# Patient Record
Sex: MALE | Race: BLACK OR AFRICAN AMERICAN | NOT HISPANIC OR LATINO | Employment: FULL TIME | URBAN - METROPOLITAN AREA
[De-identification: names, ages, dates, MRNs, and addresses within clinical notes are randomized per-mention and may not be internally consistent; named-entity substitution may affect disease eponyms.]

---

## 2022-08-31 ENCOUNTER — OFFICE VISIT (OUTPATIENT)
Dept: PODIATRY | Facility: CLINIC | Age: 37
End: 2022-08-31
Payer: COMMERCIAL

## 2022-08-31 VITALS
WEIGHT: 315 LBS | HEART RATE: 80 BPM | SYSTOLIC BLOOD PRESSURE: 142 MMHG | DIASTOLIC BLOOD PRESSURE: 88 MMHG | HEIGHT: 74 IN | BODY MASS INDEX: 40.43 KG/M2

## 2022-08-31 DIAGNOSIS — L84 CORN OR CALLUS: Primary | ICD-10-CM

## 2022-08-31 DIAGNOSIS — R26.2 DIFFICULTY WALKING: ICD-10-CM

## 2022-08-31 DIAGNOSIS — M79.671 PAIN IN RIGHT FOOT: ICD-10-CM

## 2022-08-31 DIAGNOSIS — B07.0 PLANTAR WART: ICD-10-CM

## 2022-08-31 PROCEDURE — 3008F PR BODY MASS INDEX (BMI) DOCUMENTED: ICD-10-PCS | Mod: S$GLB,,, | Performed by: PODIATRIST

## 2022-08-31 PROCEDURE — 1159F MED LIST DOCD IN RCRD: CPT | Mod: S$GLB,,, | Performed by: PODIATRIST

## 2022-08-31 PROCEDURE — 99203 PR OFFICE/OUTPT VISIT, NEW, LEVL III, 30-44 MIN: ICD-10-PCS | Mod: S$GLB,,, | Performed by: PODIATRIST

## 2022-08-31 PROCEDURE — 4010F ACE/ARB THERAPY RXD/TAKEN: CPT | Mod: S$GLB,,, | Performed by: PODIATRIST

## 2022-08-31 PROCEDURE — 3077F SYST BP >= 140 MM HG: CPT | Mod: S$GLB,,, | Performed by: PODIATRIST

## 2022-08-31 PROCEDURE — 4010F PR ACE/ARB THEARPY RXD/TAKEN: ICD-10-PCS | Mod: S$GLB,,, | Performed by: PODIATRIST

## 2022-08-31 PROCEDURE — 3079F DIAST BP 80-89 MM HG: CPT | Mod: S$GLB,,, | Performed by: PODIATRIST

## 2022-08-31 PROCEDURE — 99203 OFFICE O/P NEW LOW 30 MIN: CPT | Mod: S$GLB,,, | Performed by: PODIATRIST

## 2022-08-31 PROCEDURE — 3077F PR MOST RECENT SYSTOLIC BLOOD PRESSURE >= 140 MM HG: ICD-10-PCS | Mod: S$GLB,,, | Performed by: PODIATRIST

## 2022-08-31 PROCEDURE — 1159F PR MEDICATION LIST DOCUMENTED IN MEDICAL RECORD: ICD-10-PCS | Mod: S$GLB,,, | Performed by: PODIATRIST

## 2022-08-31 PROCEDURE — 3079F PR MOST RECENT DIASTOLIC BLOOD PRESSURE 80-89 MM HG: ICD-10-PCS | Mod: S$GLB,,, | Performed by: PODIATRIST

## 2022-08-31 PROCEDURE — 3008F BODY MASS INDEX DOCD: CPT | Mod: S$GLB,,, | Performed by: PODIATRIST

## 2022-08-31 RX ORDER — OMEPRAZOLE 40 MG/1
40 CAPSULE, DELAYED RELEASE ORAL DAILY
COMMUNITY
Start: 2022-08-02

## 2022-08-31 RX ORDER — CARVEDILOL 25 MG/1
25 TABLET ORAL
COMMUNITY
Start: 2021-10-11 | End: 2023-01-13 | Stop reason: SDUPTHER

## 2022-08-31 RX ORDER — HYDRALAZINE HYDROCHLORIDE 25 MG/1
25 TABLET, FILM COATED ORAL 4 TIMES DAILY
COMMUNITY
Start: 2022-04-16

## 2022-08-31 RX ORDER — NIFEDIPINE 90 MG/1
90 TABLET, FILM COATED, EXTENDED RELEASE ORAL DAILY
COMMUNITY
Start: 2022-08-02

## 2022-08-31 RX ORDER — VALSARTAN 320 MG/1
320 TABLET ORAL DAILY
COMMUNITY
Start: 2022-08-02

## 2022-08-31 RX ORDER — NIFEDIPINE 90 MG/1
90 TABLET, FILM COATED, EXTENDED RELEASE ORAL
COMMUNITY
Start: 2022-08-02 | End: 2023-07-28

## 2022-08-31 RX ORDER — AZITHROMYCIN 250 MG/1
TABLET, FILM COATED ORAL
COMMUNITY
Start: 2022-08-21 | End: 2022-08-31

## 2022-08-31 RX ORDER — HYDROCHLOROTHIAZIDE 25 MG/1
25 TABLET ORAL
COMMUNITY
Start: 2022-04-20

## 2022-08-31 RX ORDER — CARVEDILOL 25 MG/1
25 TABLET ORAL 2 TIMES DAILY
COMMUNITY
Start: 2022-08-02

## 2022-08-31 RX ORDER — CETIRIZINE HYDROCHLORIDE 10 MG/1
10 TABLET ORAL NIGHTLY
COMMUNITY
Start: 2022-04-20 | End: 2022-08-31

## 2022-08-31 RX ORDER — NIFEDIPINE 90 MG/1
90 TABLET, EXTENDED RELEASE ORAL
COMMUNITY
Start: 2022-04-20 | End: 2023-01-13 | Stop reason: SDUPTHER

## 2022-09-08 NOTE — PROGRESS NOTES
Subjective:      Patient ID: Raghav Zheng is a 37 y.o. male.    Chief Complaint: Foot Pain    Raghav is a 37 y.o. male who presents to the podiatry clinic  with complaint of  right foot pain. Onset of the symptoms was about a month ago. Precipitating event:  growth of painful soft tissue lesion . Current symptoms include: ability to bear weight, but with some pain. Aggravating factors: walking. Symptoms have gradually worsened. Patient has had no prior foot problems. Evaluation to date: none. Treatment to date: avoidance of offending activity. Patients rates pain 7/10 on pain scale.    Review of Systems   Constitutional: Negative for chills and fever.   Cardiovascular:  Negative for chest pain.   Respiratory:  Negative for cough and shortness of breath.    Gastrointestinal:  Negative for diarrhea, nausea and vomiting.         Objective:      Physical Exam  Vitals reviewed.   Constitutional:       General: He is not in acute distress.     Appearance: Normal appearance. He is obese. He is not ill-appearing.   HENT:      Head: Normocephalic.      Nose: Nose normal.   Musculoskeletal:         General: Tenderness (plantar right first digit) present.   Skin:     Capillary Refill: Capillary refill takes 2 to 3 seconds.   Neurological:      Mental Status: He is alert and oriented to person, place, and time.   Psychiatric:         Mood and Affect: Mood normal.         Behavior: Behavior normal.         Thought Content: Thought content normal.       Neurologic: Protective light touch sensation intact bilateral lower extremity   Vascular: DP and PT pulses palpable bilateral foot, edema noted to bilateral foot, skin temperature gradient within normal limits bilateral foot, pedal hair growth is present bilateral foot   Musculoskeletal:  5/5 muscle strength noted bilateral foot, mild tenderness with palpation of plantar hyperkeratotic lesion right 1st digit  Dermatologic:  Hyperkeratotic skin lesion noted plantar aspect right  foot no signs of infection present, no open lesions noted bilateral foot, no rashes noted bilateral foot      Assessment:       Encounter Diagnoses   Name Primary?    Corn or callus Yes    Plantar wart          Plan:       Raghav was seen today for foot pain.    Diagnoses and all orders for this visit:    Corn or callus    Plantar wart    I counseled the patient on his conditions, their implications and medical management.        1. Patient was examined and evaluated  2. Patient was made aware of soft tissue lesion plantar aspect right 1st digit.  Patient made aware about possible recurrence over time.  Patient was dispensed offloading pads for prevention of direct pressure to the area.  Patient was advised of apply ointment/moisturizer to the area for softening purposes.  Discussed plantar verruca versus plantar callus.  Mild debridement of plantar callous at this appointment  3. Discussed possible injection therapy of pain complaints worsen over time.  Discussed with patient possible excision of lesion over time if pain is unbearable.  Patient will adjunct with OTC analgesics for pain relief  4. Patient was advised discontinue any barefoot walking.  Patient will wear comfortable shoe gear both inside and outside the home.  Discussed with patient benefits of weight reduction, diet modification, and exercise for improvement of foot complaints and pressure to the right 1st digit  5. Patient will follow-up 2 months p.r.n. for complaints

## 2022-10-07 ENCOUNTER — OFFICE VISIT (OUTPATIENT)
Dept: PODIATRY | Facility: CLINIC | Age: 37
End: 2022-10-07
Payer: COMMERCIAL

## 2022-10-07 VITALS
DIASTOLIC BLOOD PRESSURE: 104 MMHG | HEART RATE: 58 BPM | BODY MASS INDEX: 5.39 KG/M2 | SYSTOLIC BLOOD PRESSURE: 166 MMHG | HEIGHT: 74 IN | WEIGHT: 42 LBS

## 2022-10-07 DIAGNOSIS — M79.671 PAIN IN RIGHT FOOT: ICD-10-CM

## 2022-10-07 DIAGNOSIS — R26.2 DIFFICULTY WALKING: ICD-10-CM

## 2022-10-07 DIAGNOSIS — B07.0 PLANTAR WART: Primary | ICD-10-CM

## 2022-10-07 PROCEDURE — 3008F BODY MASS INDEX DOCD: CPT | Mod: S$GLB,,, | Performed by: PODIATRIST

## 2022-10-07 PROCEDURE — 3080F DIAST BP >= 90 MM HG: CPT | Mod: S$GLB,,, | Performed by: PODIATRIST

## 2022-10-07 PROCEDURE — 4010F PR ACE/ARB THEARPY RXD/TAKEN: ICD-10-PCS | Mod: S$GLB,,, | Performed by: PODIATRIST

## 2022-10-07 PROCEDURE — 3008F PR BODY MASS INDEX (BMI) DOCUMENTED: ICD-10-PCS | Mod: S$GLB,,, | Performed by: PODIATRIST

## 2022-10-07 PROCEDURE — 1159F MED LIST DOCD IN RCRD: CPT | Mod: S$GLB,,, | Performed by: PODIATRIST

## 2022-10-07 PROCEDURE — 17110 DESTRUCTION B9 LES UP TO 14: CPT | Mod: S$GLB,,, | Performed by: PODIATRIST

## 2022-10-07 PROCEDURE — 17110 DESTRUCTION OF BENIGN LESION: ICD-10-PCS | Mod: S$GLB,,, | Performed by: PODIATRIST

## 2022-10-07 PROCEDURE — 3077F SYST BP >= 140 MM HG: CPT | Mod: S$GLB,,, | Performed by: PODIATRIST

## 2022-10-07 PROCEDURE — 3080F PR MOST RECENT DIASTOLIC BLOOD PRESSURE >= 90 MM HG: ICD-10-PCS | Mod: S$GLB,,, | Performed by: PODIATRIST

## 2022-10-07 PROCEDURE — 99213 OFFICE O/P EST LOW 20 MIN: CPT | Mod: 25,S$GLB,, | Performed by: PODIATRIST

## 2022-10-07 PROCEDURE — 99213 PR OFFICE/OUTPT VISIT, EST, LEVL III, 20-29 MIN: ICD-10-PCS | Mod: 25,S$GLB,, | Performed by: PODIATRIST

## 2022-10-07 PROCEDURE — 3077F PR MOST RECENT SYSTOLIC BLOOD PRESSURE >= 140 MM HG: ICD-10-PCS | Mod: S$GLB,,, | Performed by: PODIATRIST

## 2022-10-07 PROCEDURE — 1159F PR MEDICATION LIST DOCUMENTED IN MEDICAL RECORD: ICD-10-PCS | Mod: S$GLB,,, | Performed by: PODIATRIST

## 2022-10-07 PROCEDURE — 4010F ACE/ARB THERAPY RXD/TAKEN: CPT | Mod: S$GLB,,, | Performed by: PODIATRIST

## 2022-10-07 RX ORDER — CYANOCOBALAMIN 1000 UG/ML
INJECTION, SOLUTION INTRAMUSCULAR; SUBCUTANEOUS
COMMUNITY
Start: 2022-09-20

## 2022-10-07 RX ORDER — AMLODIPINE BESYLATE 10 MG/1
10 TABLET ORAL DAILY
COMMUNITY
Start: 2022-09-12 | End: 2023-06-01

## 2022-10-07 NOTE — PROGRESS NOTES
Subjective:      Patient ID: Raghav Zheng is a 37 y.o. male.    Chief Complaint: Foot Pain and Follow-up    Raghav is a 37 y.o. male who presents to the podiatry clinic  with complaint of  right foot pain. Onset of the symptoms was about a month ago. Precipitating event:  growth of soft tissue lesion . Current symptoms include: ability to bear weight, but with some pain and worsening symptoms after a period of activity. Aggravating factors: walking and direct pressure . Symptoms have gradually worsened. Patient has had no prior foot problems. Evaluation to date: none. Treatment to date:  offloading and debridement at previous appointment . Patients rates pain 8/10 on pain scale.    Review of Systems   Constitutional: Negative for chills and fever.   Cardiovascular:  Negative for chest pain and leg swelling.   Respiratory:  Negative for cough and shortness of breath.    Gastrointestinal:  Negative for diarrhea, nausea and vomiting.         Objective:      Physical Exam  Constitutional:       General: He is not in acute distress.     Appearance: Normal appearance. He is not ill-appearing.   HENT:      Head: Normocephalic.   Pulmonary:      Effort: Pulmonary effort is normal. No respiratory distress.   Skin:     Capillary Refill: Capillary refill takes 2 to 3 seconds.   Neurological:      Mental Status: He is alert and oriented to person, place, and time.   Psychiatric:         Mood and Affect: Mood normal.         Behavior: Behavior normal.     Neurologic: Protective light touch sensation intact bilateral lower extremity   Vascular: DP and PT pulses palpable bilateral foot, edema noted to bilateral foot, skin temperature gradient within normal limits bilateral foot, pedal hair growth is present bilateral foot   Musculoskeletal:  5/5 muscle strength noted bilateral foot, mild tenderness with palpation of plantar hyperkeratotic lesion right 1st digit  Dermatologic:  Hyperkeratotic skin lesion noted plantar aspect right  foot no signs of infection present, no open lesions noted bilateral foot, no rashes noted bilateral foot        Assessment:       Encounter Diagnoses   Name Primary?    Plantar wart Yes    Pain in right foot     Difficulty walking          Plan:       Raghav was seen today for foot pain and follow-up.    Diagnoses and all orders for this visit:    Plantar wart  -     Destruction of Benign Lesion    Pain in right foot  -     Destruction of Benign Lesion    Difficulty walking  -     Destruction of Benign Lesion    I counseled the patient on his conditions, their implications and medical management.      1. Patient was examined and evaluated  2. Discussed with patient etiology of plantar verruca versus plantar Tyloma.  Patient was dispensed offloading pads for continued prevention of direct pressure to the lesion.  Patient was advised to apply ointments/moisturizers for softening purposes.  Discussed possible recurrence with patient.  Discussed benefits and risks of cryotherapy with patient   Destruction of Benign Lesion    Date/Time: 10/7/2022 8:00 AM  Performed by: Linh Dubois DPM  Authorized by: Linh Dubois DPM     Consent Done?:  Yes (Verbal)  Indications:     Destruction Indications: plantar verrucae.  Location:     Lower Extremity:  Foot    Detail:  Right foot  Prep:     Preparation: Patient was prepped and draped in usual sterile fashion    Procedure Details:     Cosmetic?: No      Number of lesions:  1    Destruction method:  Cryotherapy    Bleeding:  None     Patient tolerated the procedure well with no immediate complications.     Post-operative instructions were provided for the patient.     Patient was discharged and will follow up for wound check.   3. Patient was advised discontinue use of flip-flops and slides.  Patient will limit barefoot walking  4. Patient will ultimately follow up in 3 weeks or p.r.n. for complaints

## 2022-11-21 ENCOUNTER — OFFICE VISIT (OUTPATIENT)
Dept: PODIATRY | Facility: CLINIC | Age: 37
End: 2022-11-21
Payer: COMMERCIAL

## 2022-11-21 VITALS
HEART RATE: 101 BPM | DIASTOLIC BLOOD PRESSURE: 100 MMHG | SYSTOLIC BLOOD PRESSURE: 157 MMHG | WEIGHT: 315 LBS | HEIGHT: 74 IN | BODY MASS INDEX: 40.43 KG/M2

## 2022-11-21 DIAGNOSIS — M79.671 PAIN IN RIGHT FOOT: ICD-10-CM

## 2022-11-21 DIAGNOSIS — B07.0 PLANTAR WART: Primary | ICD-10-CM

## 2022-11-21 DIAGNOSIS — L84 CORN OR CALLUS: ICD-10-CM

## 2022-11-21 PROCEDURE — 3077F PR MOST RECENT SYSTOLIC BLOOD PRESSURE >= 140 MM HG: ICD-10-PCS | Mod: S$GLB,,, | Performed by: PODIATRIST

## 2022-11-21 PROCEDURE — 3008F PR BODY MASS INDEX (BMI) DOCUMENTED: ICD-10-PCS | Mod: S$GLB,,, | Performed by: PODIATRIST

## 2022-11-21 PROCEDURE — 4010F PR ACE/ARB THEARPY RXD/TAKEN: ICD-10-PCS | Mod: S$GLB,,, | Performed by: PODIATRIST

## 2022-11-21 PROCEDURE — 99213 PR OFFICE/OUTPT VISIT, EST, LEVL III, 20-29 MIN: ICD-10-PCS | Mod: 25,S$GLB,, | Performed by: PODIATRIST

## 2022-11-21 PROCEDURE — 3077F SYST BP >= 140 MM HG: CPT | Mod: S$GLB,,, | Performed by: PODIATRIST

## 2022-11-21 PROCEDURE — 17110 DESTRUCTION B9 LES UP TO 14: CPT | Mod: S$GLB,,, | Performed by: PODIATRIST

## 2022-11-21 PROCEDURE — 17110 DESTRUCTION OF BENIGN LESION: ICD-10-PCS | Mod: S$GLB,,, | Performed by: PODIATRIST

## 2022-11-21 PROCEDURE — 3008F BODY MASS INDEX DOCD: CPT | Mod: S$GLB,,, | Performed by: PODIATRIST

## 2022-11-21 PROCEDURE — 3080F PR MOST RECENT DIASTOLIC BLOOD PRESSURE >= 90 MM HG: ICD-10-PCS | Mod: S$GLB,,, | Performed by: PODIATRIST

## 2022-11-21 PROCEDURE — 3080F DIAST BP >= 90 MM HG: CPT | Mod: S$GLB,,, | Performed by: PODIATRIST

## 2022-11-21 PROCEDURE — 99213 OFFICE O/P EST LOW 20 MIN: CPT | Mod: 25,S$GLB,, | Performed by: PODIATRIST

## 2022-11-21 PROCEDURE — 1159F MED LIST DOCD IN RCRD: CPT | Mod: S$GLB,,, | Performed by: PODIATRIST

## 2022-11-21 PROCEDURE — 4010F ACE/ARB THERAPY RXD/TAKEN: CPT | Mod: S$GLB,,, | Performed by: PODIATRIST

## 2022-11-21 PROCEDURE — 1159F PR MEDICATION LIST DOCUMENTED IN MEDICAL RECORD: ICD-10-PCS | Mod: S$GLB,,, | Performed by: PODIATRIST

## 2022-11-21 NOTE — PROGRESS NOTES
Subjective:      Patient ID: Raghav Zheng is a 37 y.o. male.    Chief Complaint: No chief complaint on file.    Raghav is a 37 y.o. male who presents to the podiatry clinic  with complaint of  right foot pain. Onset of the symptoms was several months ago. Precipitating event:  growth of plantar wart . Current symptoms include: ability to bear weight, but with some pain, redness, and worsening symptoms after a period of activity. Aggravating factors:  direct pressure to distal plantar right first digit . Symptoms have gradually improved. Patient has had prior foot problems. Evaluation to date: none. Treatment to date:  cryotherapy attempted at prior appointment which patients believes was effective . Patients rates pain 3/10 on pain scale.    Review of Systems   Constitutional: Negative for chills and fever.   Cardiovascular:  Negative for chest pain and leg swelling.   Respiratory:  Negative for cough and shortness of breath.    Gastrointestinal:  Negative for diarrhea, nausea and vomiting.         Objective:      Physical Exam  Vitals reviewed.   Constitutional:       General: He is not in acute distress.     Appearance: Normal appearance. He is not ill-appearing.   Pulmonary:      Effort: Pulmonary effort is normal. No respiratory distress.   Skin:     Capillary Refill: Capillary refill takes 2 to 3 seconds.   Neurological:      Mental Status: He is alert and oriented to person, place, and time.   Psychiatric:         Mood and Affect: Mood normal.         Behavior: Behavior normal.     Neurologic: Protective and light touch sensation intact bilateral lower extremity   Vascular: DP and PT pulses palpable bilateral foot, edema noted to bilateral foot, skin temperature gradient within normal limits bilateral foot, pedal hair growth is present bilateral foot   Musculoskeletal:  5/5 muscle strength noted bilateral foot, mild tenderness with palpation of plantar hyperkeratotic lesion right 1st digit  Dermatologic:   Hyperkeratotic skin lesion noted plantar aspect right foot (appears smaller in diameter then prior office visit), no signs of infection present, no open lesions noted bilateral foot, no rashes noted bilateral foot          Assessment:       Encounter Diagnoses   Name Primary?    Plantar wart Yes    Corn or callus     Pain in right foot          Plan:       Diagnoses and all orders for this visit:    Plantar wart  -     Destruction of Benign Lesion    [place] or callus    Pain in right foot    I counseled the patient on his conditions, their implications and medical management.        1. Patient was examined and evaluated.    2. Again discussed with patient etiology of plantar verruca.  Patient made aware about high level of recurrence with these soft tissue lesions.  Area does look to be improved from previous appointment so additional cryotherapy will be attempted today.  Discussed similarities between plantar verruca and IPK versus common callus.    Destruction of Benign Lesion    Date/Time: 11/21/2022 8:30 AM  Performed by: Linh Dubois DPM  Authorized by: Linh Dubois DPM     Consent Done?:  Yes (Verbal)  Indications:     Destruction Indications: right foot plantar verrucae.  Location:     Lower Extremity:  Toe    Detail:  Right big toe  Prep:     Preparation: Patient was prepped and draped in usual sterile fashion    Procedure Details:     Cosmetic?: No      Number of lesions:  1    Destruction method:  Cryotherapy    Dressings: aperture pad and a band aid.    Bleeding:  None     Patient tolerated the procedure well with no immediate complications.     Post-operative instructions were provided for the patient.     Patient was discharged and will follow up for wound check.    3. Patient was dispensed offloading pads for offloading of the area.  Patient will continue with comfortable shoe gear both inside and outside the home.  Patient will adjunct with OTC analgesics for pain relief  4. Patient will  follow up in 1 month or p.r.n. foot complaints

## 2022-12-21 ENCOUNTER — OFFICE VISIT (OUTPATIENT)
Dept: PODIATRY | Facility: CLINIC | Age: 37
End: 2022-12-21
Payer: COMMERCIAL

## 2022-12-21 VITALS
HEIGHT: 74 IN | RESPIRATION RATE: 18 BRPM | OXYGEN SATURATION: 100 % | DIASTOLIC BLOOD PRESSURE: 88 MMHG | WEIGHT: 315 LBS | HEART RATE: 98 BPM | BODY MASS INDEX: 40.43 KG/M2 | SYSTOLIC BLOOD PRESSURE: 139 MMHG

## 2022-12-21 DIAGNOSIS — L84 CORN OR CALLUS: ICD-10-CM

## 2022-12-21 DIAGNOSIS — B07.0 PLANTAR WART: Primary | ICD-10-CM

## 2022-12-21 PROCEDURE — 99212 OFFICE O/P EST SF 10 MIN: CPT | Mod: S$GLB,,, | Performed by: PODIATRIST

## 2022-12-21 PROCEDURE — 3075F PR MOST RECENT SYSTOLIC BLOOD PRESS GE 130-139MM HG: ICD-10-PCS | Mod: S$GLB,,, | Performed by: PODIATRIST

## 2022-12-21 PROCEDURE — 1159F PR MEDICATION LIST DOCUMENTED IN MEDICAL RECORD: ICD-10-PCS | Mod: S$GLB,,, | Performed by: PODIATRIST

## 2022-12-21 PROCEDURE — 3079F PR MOST RECENT DIASTOLIC BLOOD PRESSURE 80-89 MM HG: ICD-10-PCS | Mod: S$GLB,,, | Performed by: PODIATRIST

## 2022-12-21 PROCEDURE — 4010F PR ACE/ARB THEARPY RXD/TAKEN: ICD-10-PCS | Mod: S$GLB,,, | Performed by: PODIATRIST

## 2022-12-21 PROCEDURE — 1159F MED LIST DOCD IN RCRD: CPT | Mod: S$GLB,,, | Performed by: PODIATRIST

## 2022-12-21 PROCEDURE — 4010F ACE/ARB THERAPY RXD/TAKEN: CPT | Mod: S$GLB,,, | Performed by: PODIATRIST

## 2022-12-21 PROCEDURE — 3008F PR BODY MASS INDEX (BMI) DOCUMENTED: ICD-10-PCS | Mod: S$GLB,,, | Performed by: PODIATRIST

## 2022-12-21 PROCEDURE — 3008F BODY MASS INDEX DOCD: CPT | Mod: S$GLB,,, | Performed by: PODIATRIST

## 2022-12-21 PROCEDURE — 99212 PR OFFICE/OUTPT VISIT, EST, LEVL II, 10-19 MIN: ICD-10-PCS | Mod: S$GLB,,, | Performed by: PODIATRIST

## 2022-12-21 PROCEDURE — 3075F SYST BP GE 130 - 139MM HG: CPT | Mod: S$GLB,,, | Performed by: PODIATRIST

## 2022-12-21 PROCEDURE — 3079F DIAST BP 80-89 MM HG: CPT | Mod: S$GLB,,, | Performed by: PODIATRIST

## 2022-12-21 NOTE — PROGRESS NOTES
Subjective:      Patient ID: Raghav Zheng is a 37 y.o. male.    Chief Complaint: Chaparro Avilez is a 37 y.o. male who presents to the podiatry clinic  with complaint of  right foot pain. Onset of the symptoms was several months ago. Precipitating event:  growth of painful soft tissue lesion . Current symptoms include:  none . Aggravating factors:  none currently . Symptoms have essentially resolved. Patient has had prior foot problems. Evaluation to date: none. Treatment to date:  cryotherapy/ freeze of painful soft tissue lesion plantar right 1st toe which was effective . Patients rates pain 0/10 on pain scale.    Review of Systems   Constitutional: Negative for chills and fever.   Cardiovascular:  Negative for chest pain and leg swelling.   Respiratory:  Negative for cough and shortness of breath.    Gastrointestinal:  Negative for diarrhea, nausea and vomiting.         Objective:      Physical Exam  Vitals reviewed.   Constitutional:       General: He is not in acute distress.     Appearance: Normal appearance. He is not ill-appearing.   HENT:      Head: Normocephalic.      Nose: Nose normal.   Pulmonary:      Effort: Pulmonary effort is normal. No respiratory distress.   Skin:     Capillary Refill: Capillary refill takes 2 to 3 seconds.   Neurological:      Mental Status: He is alert and oriented to person, place, and time.   Psychiatric:         Mood and Affect: Mood normal.         Behavior: Behavior normal.     Neurologic: Protective and light touch sensation intact bilateral lower extremity   Vascular: DP and PT pulses palpable bilateral foot, edema noted to bilateral foot, skin temperature gradient within normal limits bilateral foot, pedal hair growth is present bilateral foot   Musculoskeletal:  5/5 muscle strength noted bilateral foot, NO tenderness with palpation of plantar hyperkeratotic lesion right 1st digit  Dermatologic:  Hyperkeratotic skin lesion noted plantar aspect right foot (appears  much smaller in diameter then prior office visit), no signs of infection present, no open lesions noted bilateral foot, no rashes noted bilateral foot        Assessment:       Encounter Diagnoses   Name Primary?    Plantar wart Yes    Corn or callus          Plan:       Raghav was seen today for callouses.    Diagnoses and all orders for this visit:    Plantar wart    Corn or callus      I counseled the patient on his conditions, their implications and medical management.        1. Patient was examined and evaluated  2. Discussed again discussed with patient etiology of plantar verruca.  Patient was made aware about possible recurrence.  Patient was advised to continue use of offloading pads as necessary if pain returns.  Patient was advised to continue with application of ointment/moisturizer for softening purposes to the area.    3. Discussed with patient continued decreased barefoot walking and use of flip-flops.    4. Patient will follow-up in 3 weeks or p.r.n. for complaints

## 2023-02-20 ENCOUNTER — OFFICE VISIT (OUTPATIENT)
Dept: PODIATRY | Facility: CLINIC | Age: 38
End: 2023-02-20
Payer: COMMERCIAL

## 2023-02-20 VITALS
OXYGEN SATURATION: 99 % | WEIGHT: 315 LBS | HEART RATE: 97 BPM | SYSTOLIC BLOOD PRESSURE: 111 MMHG | BODY MASS INDEX: 40.43 KG/M2 | DIASTOLIC BLOOD PRESSURE: 72 MMHG | RESPIRATION RATE: 18 BRPM | HEIGHT: 74 IN

## 2023-02-20 DIAGNOSIS — B07.0 PLANTAR WART: ICD-10-CM

## 2023-02-20 DIAGNOSIS — M76.72 TENDINITIS OF LEFT PERONEUS BREVIS TENDON: Primary | ICD-10-CM

## 2023-02-20 PROCEDURE — 3074F PR MOST RECENT SYSTOLIC BLOOD PRESSURE < 130 MM HG: ICD-10-PCS | Mod: S$GLB,,, | Performed by: PODIATRIST

## 2023-02-20 PROCEDURE — 3008F PR BODY MASS INDEX (BMI) DOCUMENTED: ICD-10-PCS | Mod: S$GLB,,, | Performed by: PODIATRIST

## 2023-02-20 PROCEDURE — 1160F PR REVIEW ALL MEDS BY PRESCRIBER/CLIN PHARMACIST DOCUMENTED: ICD-10-PCS | Mod: S$GLB,,, | Performed by: PODIATRIST

## 2023-02-20 PROCEDURE — 1159F MED LIST DOCD IN RCRD: CPT | Mod: S$GLB,,, | Performed by: PODIATRIST

## 2023-02-20 PROCEDURE — 3078F DIAST BP <80 MM HG: CPT | Mod: S$GLB,,, | Performed by: PODIATRIST

## 2023-02-20 PROCEDURE — 1160F RVW MEDS BY RX/DR IN RCRD: CPT | Mod: S$GLB,,, | Performed by: PODIATRIST

## 2023-02-20 PROCEDURE — 99212 OFFICE O/P EST SF 10 MIN: CPT | Mod: S$GLB,,, | Performed by: PODIATRIST

## 2023-02-20 PROCEDURE — 3074F SYST BP LT 130 MM HG: CPT | Mod: S$GLB,,, | Performed by: PODIATRIST

## 2023-02-20 PROCEDURE — 1159F PR MEDICATION LIST DOCUMENTED IN MEDICAL RECORD: ICD-10-PCS | Mod: S$GLB,,, | Performed by: PODIATRIST

## 2023-02-20 PROCEDURE — 3008F BODY MASS INDEX DOCD: CPT | Mod: S$GLB,,, | Performed by: PODIATRIST

## 2023-02-20 PROCEDURE — 99212 PR OFFICE/OUTPT VISIT, EST, LEVL II, 10-19 MIN: ICD-10-PCS | Mod: S$GLB,,, | Performed by: PODIATRIST

## 2023-02-20 PROCEDURE — 3078F PR MOST RECENT DIASTOLIC BLOOD PRESSURE < 80 MM HG: ICD-10-PCS | Mod: S$GLB,,, | Performed by: PODIATRIST

## 2023-02-22 NOTE — PROGRESS NOTES
Subjective:      Patient ID: Raghav Zheng is a 37 y.o. male.    Chief Complaint: Follow-up    Raghav is a 37 y.o. male who presents to the podiatry clinic  with complaint of  bilateral foot pain. Onset of the symptoms was several weeks ago. Precipitating event:  growth of soft tissue lesion/ possible wart; prolonged ambulation in shoes/ sandals with lateral wear down . Current symptoms include: ability to bear weight, but with some pain and worsening symptoms after a period of activity. Aggravating factors:  prolonged standing/ ambulation . Symptoms have essentially resolved. Patient has had prior foot problems. Evaluation to date: none. Treatment to date:  cryotherapy of plantar wart which was very effective . Patients rates pain 0/10 on pain scale.    Review of Systems   Constitutional: Negative for chills and fever.   Cardiovascular:  Negative for chest pain and leg swelling.   Respiratory:  Negative for cough and shortness of breath.    Gastrointestinal:  Negative for diarrhea, nausea and vomiting.         Objective:      Physical Exam  Vitals reviewed.   Constitutional:       General: He is not in acute distress.     Appearance: Normal appearance. He is not ill-appearing.   HENT:      Head: Normocephalic.      Nose: Nose normal.   Pulmonary:      Effort: Pulmonary effort is normal. No respiratory distress.   Skin:     Capillary Refill: Capillary refill takes 2 to 3 seconds.   Neurological:      Mental Status: He is alert and oriented to person, place, and time.   Psychiatric:         Mood and Affect: Mood normal.         Behavior: Behavior normal.     Neurologic: Protective and light touch sensation intact bilateral lower extremity   Vascular: DP and PT pulses palpable bilateral foot, edema noted to bilateral foot, skin temperature gradient within normal limits bilateral foot, pedal hair growth is present bilateral foot   Musculoskeletal:  5/5 muscle strength noted bilateral foot, mild tenderness with  inversion/ eversion of the bilateral foot and pain with palpation of the 5th metatarsal base, mild tenderness with range of motion of bilateral peroneal brevis tendon and palpation of its insertion site   Dermatologic:  No evidence of previous hyperkeratotic skin lesion with loss of skin lines (probable verrucae) which was previously noted at the distal tip of the right 1st digit, no signs of infection present, no open lesions noted bilateral foot, no rashes noted bilateral foot, mild hyperkeratotic skin lesion noted to the bilateral plantar 5th MPJ        Assessment:       Encounter Diagnoses   Name Primary?    Tendinitis of left peroneus brevis tendon Yes    Plantar wart          Plan:       Raghav was seen today for follow-up.    Diagnoses and all orders for this visit:    Tendinitis of left peroneus brevis tendon    Plantar wart      I counseled the patient on his conditions, their implications and medical management.        1. Patient was examined and evaluated  2. Discussed potential etiology of peroneal brevis tendinitis.  Patient was advised to discontinue any shoes that show excessive lateral wear and tear.  Patient was advised to ice and elevate lower extremity when at rest and to adjunct with OTC NSAIDs for pain relief  3. Discussed with patient previous wart distal tip of the right 1st digit.  Patient made aware that the lesion seems to have resolved with previous cryotherapy.  Patient was warned of possible recurrence over time.    4. Patient was advised to continue with comfortable shoe gear both inside and outside the home.    5. Patient will follow up p.r.n. for complaints

## 2023-06-01 ENCOUNTER — OFFICE VISIT (OUTPATIENT)
Dept: PODIATRY | Facility: CLINIC | Age: 38
End: 2023-06-01
Payer: COMMERCIAL

## 2023-06-01 VITALS
SYSTOLIC BLOOD PRESSURE: 131 MMHG | WEIGHT: 315 LBS | DIASTOLIC BLOOD PRESSURE: 90 MMHG | BODY MASS INDEX: 40.43 KG/M2 | HEART RATE: 74 BPM | HEIGHT: 74 IN

## 2023-06-01 DIAGNOSIS — M76.71 TENDINITIS OF RIGHT PERONEUS BREVIS TENDON: ICD-10-CM

## 2023-06-01 DIAGNOSIS — S96.911A RIGHT FOOT STRAIN, INITIAL ENCOUNTER: Primary | ICD-10-CM

## 2023-06-01 DIAGNOSIS — M79.671 RIGHT FOOT PAIN: ICD-10-CM

## 2023-06-01 DIAGNOSIS — M77.51 BURSITIS OF RIGHT FOOT: ICD-10-CM

## 2023-06-01 DIAGNOSIS — R26.2 DIFFICULTY WALKING: ICD-10-CM

## 2023-06-01 PROCEDURE — 3075F PR MOST RECENT SYSTOLIC BLOOD PRESS GE 130-139MM HG: ICD-10-PCS | Mod: S$GLB,,, | Performed by: PODIATRIST

## 2023-06-01 PROCEDURE — 20600 DRAIN/INJ JOINT/BURSA W/O US: CPT | Mod: RT,S$GLB,, | Performed by: PODIATRIST

## 2023-06-01 PROCEDURE — 1160F RVW MEDS BY RX/DR IN RCRD: CPT | Mod: S$GLB,,, | Performed by: PODIATRIST

## 2023-06-01 PROCEDURE — 3075F SYST BP GE 130 - 139MM HG: CPT | Mod: S$GLB,,, | Performed by: PODIATRIST

## 2023-06-01 PROCEDURE — 3080F DIAST BP >= 90 MM HG: CPT | Mod: S$GLB,,, | Performed by: PODIATRIST

## 2023-06-01 PROCEDURE — 3080F PR MOST RECENT DIASTOLIC BLOOD PRESSURE >= 90 MM HG: ICD-10-PCS | Mod: S$GLB,,, | Performed by: PODIATRIST

## 2023-06-01 PROCEDURE — 3008F BODY MASS INDEX DOCD: CPT | Mod: S$GLB,,, | Performed by: PODIATRIST

## 2023-06-01 PROCEDURE — 3008F PR BODY MASS INDEX (BMI) DOCUMENTED: ICD-10-PCS | Mod: S$GLB,,, | Performed by: PODIATRIST

## 2023-06-01 PROCEDURE — 1159F PR MEDICATION LIST DOCUMENTED IN MEDICAL RECORD: ICD-10-PCS | Mod: S$GLB,,, | Performed by: PODIATRIST

## 2023-06-01 PROCEDURE — 20600 PR DRAIN/INJECT SMALL JOINT/BURSA: ICD-10-PCS | Mod: RT,S$GLB,, | Performed by: PODIATRIST

## 2023-06-01 PROCEDURE — 4010F PR ACE/ARB THEARPY RXD/TAKEN: ICD-10-PCS | Mod: S$GLB,,, | Performed by: PODIATRIST

## 2023-06-01 PROCEDURE — 99213 PR OFFICE/OUTPT VISIT, EST, LEVL III, 20-29 MIN: ICD-10-PCS | Mod: 25,S$GLB,, | Performed by: PODIATRIST

## 2023-06-01 PROCEDURE — 99213 OFFICE O/P EST LOW 20 MIN: CPT | Mod: 25,S$GLB,, | Performed by: PODIATRIST

## 2023-06-01 PROCEDURE — 4010F ACE/ARB THERAPY RXD/TAKEN: CPT | Mod: S$GLB,,, | Performed by: PODIATRIST

## 2023-06-01 PROCEDURE — 1159F MED LIST DOCD IN RCRD: CPT | Mod: S$GLB,,, | Performed by: PODIATRIST

## 2023-06-01 PROCEDURE — 1160F PR REVIEW ALL MEDS BY PRESCRIBER/CLIN PHARMACIST DOCUMENTED: ICD-10-PCS | Mod: S$GLB,,, | Performed by: PODIATRIST

## 2023-06-01 RX ORDER — SEMAGLUTIDE 2.4 MG/.75ML
2.4 INJECTION, SOLUTION SUBCUTANEOUS
COMMUNITY
Start: 2023-05-08

## 2023-06-01 RX ORDER — NIFEDIPINE 60 MG/1
60 TABLET, EXTENDED RELEASE ORAL
COMMUNITY
Start: 2023-05-01

## 2023-06-01 RX ORDER — LIDOCAINE HYDROCHLORIDE 10 MG/ML
1 INJECTION INFILTRATION; PERINEURAL
Status: DISCONTINUED | OUTPATIENT
Start: 2023-06-01 | End: 2023-06-01 | Stop reason: HOSPADM

## 2023-06-01 RX ORDER — AMOXICILLIN AND CLAVULANATE POTASSIUM 875; 125 MG/1; MG/1
1 TABLET, FILM COATED ORAL EVERY 12 HOURS
COMMUNITY
Start: 2023-05-26

## 2023-06-01 RX ORDER — DEXAMETHASONE SODIUM PHOSPHATE 4 MG/ML
4 INJECTION, SOLUTION INTRA-ARTICULAR; INTRALESIONAL; INTRAMUSCULAR; INTRAVENOUS; SOFT TISSUE
Status: DISCONTINUED | OUTPATIENT
Start: 2023-06-01 | End: 2023-06-01 | Stop reason: HOSPADM

## 2023-06-01 RX ORDER — PROMETHAZINE HYDROCHLORIDE AND DEXTROMETHORPHAN HYDROBROMIDE 6.25; 15 MG/5ML; MG/5ML
5 SYRUP ORAL EVERY 6 HOURS PRN
COMMUNITY
Start: 2023-05-26

## 2023-06-01 RX ADMIN — LIDOCAINE HYDROCHLORIDE 1 ML: 10 INJECTION INFILTRATION; PERINEURAL at 10:06

## 2023-06-01 RX ADMIN — DEXAMETHASONE SODIUM PHOSPHATE 4 MG: 4 INJECTION, SOLUTION INTRA-ARTICULAR; INTRALESIONAL; INTRAMUSCULAR; INTRAVENOUS; SOFT TISSUE at 10:06

## 2023-06-01 NOTE — PROGRESS NOTES
Subjective:     Patient ID: aRghav Zheng is a 37 y.o. male.    Chief Complaint: Follow-up and Foot Pain    Raghav is a 37 y.o. male who presents to the podiatry clinic  with complaint of  right foot pain. Onset of the symptoms was a week ago. Precipitating event: injured while playing basketball and increased activity. Current symptoms include: ability to bear weight, but with some pain, swelling, and worsening symptoms after a period of activity. Aggravating factors:  prolonged use . Symptoms have gradually improved. Patient has had prior foot problems. Evaluation to date: none. Treatment to date: OTC analgesics which are somewhat effective and rest. Patients rates pain 5/10 on pain scale.    Review of Systems   Constitutional: Negative for chills and fever.   Cardiovascular:  Negative for chest pain and leg swelling.   Respiratory:  Negative for cough and shortness of breath.    Gastrointestinal:  Negative for diarrhea, nausea and vomiting.      Objective:     Physical Exam  Vitals reviewed.   Constitutional:       General: He is not in acute distress.     Appearance: Normal appearance. He is not ill-appearing.   HENT:      Head: Normocephalic.      Nose: Nose normal.   Cardiovascular:      Rate and Rhythm: Normal rate.   Pulmonary:      Effort: Pulmonary effort is normal. No respiratory distress.   Skin:     Capillary Refill: Capillary refill takes 2 to 3 seconds.   Neurological:      Mental Status: He is alert and oriented to person, place, and time.   Psychiatric:         Mood and Affect: Mood normal.         Behavior: Behavior normal.         Thought Content: Thought content normal.     Neurologic:  Protective and light touch sensation intact bilateral lower extremity  Vascular:  DP and PT pulses palpable 2/4 bilateral foot, capillary fill time less than 3 seconds to distal digits bilateral foot   Musculoskeletal:  5/5 muscle strength noted bilateral foot, right foot has pain and tenderness at the peroneal  brevis tendon insertion and along the lateral margin of the right foot.  Patient has pain and tenderness width inversion of the right foot but at the distal portion of the foot near the 5th metatarsal  Dermatologic:  No open lesions noted bilateral foot, no rashes noted bilateral foot, no bruising noted to the lateral right foot, mild edema noted lateral right foot      Assessment:      Encounter Diagnoses   Name Primary?    Right foot strain, initial encounter Yes    Right foot pain     Difficulty walking     Tendinitis of right peroneus brevis tendon     Bursitis of right foot      Plan:     Raghav was seen today for follow-up and foot pain.    Diagnoses and all orders for this visit:    Right foot strain, initial encounter    Right foot pain  -     Small Joint Aspiration/Injection    Difficulty walking  -     Small Joint Aspiration/Injection    Tendinitis of right peroneus brevis tendon    Bursitis of right foot  -     Small Joint Aspiration/Injection      I counseled the patient on his conditions, their implications and medical management.        1. Patient was examined and evaluated.    2. Discussed patient etiology of right foot strain.  Patient was advised to perform PRICE therapy at home.  Patient had a low dye ankle/foot strapping applied to the right foot.  Patient was advised to discontinue basketball until symptoms improve.    Small Joint Aspiration/Injection    Date/Time: 6/1/2023 10:45 AM  Performed by: Linh Dubois DPM  Authorized by: Linh Dubois DPM     Consent Done?:  Yes (Verbal)  Indications:  Pain  Location:  Foot (right)  Foot joint: right 5th metatarsal bursa.  Ultrasonic guidance for needle placement?: No    Needle size:  25 G  Approach:  Lateral  Medications:  1 mL LIDOcaine HCL 10 mg/ml (1%) 10 mg/mL (1 %); 4 mg dexAMETHasone 4 mg/mL  Patient tolerance:  Patient tolerated the procedure well with no immediate complications    3. Discussed with patient etiology of peroneal brevis  tendinitis.  Patient was shown active and passive range of motion techniques.  Patient was advised to alter shoe gear for better comfort and fit   4. Patient will follow-up in 1 week or p.r.n. for complaints

## 2023-06-01 NOTE — PROCEDURES
"Procedures  STRAPPING/TAPING OF ANKLE AND FOOT:  LOW DYE RIGHT FOOT    Patient had a low dye taping applied to the right foot-1 in athletic tape applied from lateral 5th MPJ around the ankle to medial 1st MPJ x 2 with two plantar straps of 2 inch athletic tape applied from medial to lateral from just distal to the heel to the level of the midfoot. Then,  1 in athletic tape applied from lateral 5th MPJ around the ankle to medial 1st MPJ x 2 along with a Chavez's rest strap composed of 3 " tensoplast. Keep taping clean, dry, and intact for 1 - 3 days.    "

## 2023-06-08 ENCOUNTER — OFFICE VISIT (OUTPATIENT)
Dept: PODIATRY | Facility: CLINIC | Age: 38
End: 2023-06-08
Payer: COMMERCIAL

## 2023-06-08 VITALS
DIASTOLIC BLOOD PRESSURE: 93 MMHG | HEIGHT: 74 IN | BODY MASS INDEX: 40.43 KG/M2 | SYSTOLIC BLOOD PRESSURE: 135 MMHG | WEIGHT: 315 LBS | HEART RATE: 77 BPM

## 2023-06-08 DIAGNOSIS — M79.671 RIGHT FOOT PAIN: ICD-10-CM

## 2023-06-08 DIAGNOSIS — S96.911D RIGHT FOOT STRAIN, SUBSEQUENT ENCOUNTER: Primary | ICD-10-CM

## 2023-06-08 DIAGNOSIS — M77.51 BURSITIS OF RIGHT FOOT: ICD-10-CM

## 2023-06-08 DIAGNOSIS — R26.2 DIFFICULTY WALKING: ICD-10-CM

## 2023-06-08 PROCEDURE — 3008F BODY MASS INDEX DOCD: CPT | Mod: S$GLB,,, | Performed by: PODIATRIST

## 2023-06-08 PROCEDURE — 4010F PR ACE/ARB THEARPY RXD/TAKEN: ICD-10-PCS | Mod: S$GLB,,, | Performed by: PODIATRIST

## 2023-06-08 PROCEDURE — 1159F MED LIST DOCD IN RCRD: CPT | Mod: S$GLB,,, | Performed by: PODIATRIST

## 2023-06-08 PROCEDURE — 3075F PR MOST RECENT SYSTOLIC BLOOD PRESS GE 130-139MM HG: ICD-10-PCS | Mod: S$GLB,,, | Performed by: PODIATRIST

## 2023-06-08 PROCEDURE — 3080F PR MOST RECENT DIASTOLIC BLOOD PRESSURE >= 90 MM HG: ICD-10-PCS | Mod: S$GLB,,, | Performed by: PODIATRIST

## 2023-06-08 PROCEDURE — 3008F PR BODY MASS INDEX (BMI) DOCUMENTED: ICD-10-PCS | Mod: S$GLB,,, | Performed by: PODIATRIST

## 2023-06-08 PROCEDURE — 1159F PR MEDICATION LIST DOCUMENTED IN MEDICAL RECORD: ICD-10-PCS | Mod: S$GLB,,, | Performed by: PODIATRIST

## 2023-06-08 PROCEDURE — 99213 OFFICE O/P EST LOW 20 MIN: CPT | Mod: 25,S$GLB,, | Performed by: PODIATRIST

## 2023-06-08 PROCEDURE — 20600 DRAIN/INJ JOINT/BURSA W/O US: CPT | Mod: RT,S$GLB,, | Performed by: PODIATRIST

## 2023-06-08 PROCEDURE — 3080F DIAST BP >= 90 MM HG: CPT | Mod: S$GLB,,, | Performed by: PODIATRIST

## 2023-06-08 PROCEDURE — 4010F ACE/ARB THERAPY RXD/TAKEN: CPT | Mod: S$GLB,,, | Performed by: PODIATRIST

## 2023-06-08 PROCEDURE — 1160F PR REVIEW ALL MEDS BY PRESCRIBER/CLIN PHARMACIST DOCUMENTED: ICD-10-PCS | Mod: S$GLB,,, | Performed by: PODIATRIST

## 2023-06-08 PROCEDURE — 20600 SMALL JOINT ASPIRATION/INJECTION: R SMALL MTP: ICD-10-PCS | Mod: RT,S$GLB,, | Performed by: PODIATRIST

## 2023-06-08 PROCEDURE — 3075F SYST BP GE 130 - 139MM HG: CPT | Mod: S$GLB,,, | Performed by: PODIATRIST

## 2023-06-08 PROCEDURE — 1160F RVW MEDS BY RX/DR IN RCRD: CPT | Mod: S$GLB,,, | Performed by: PODIATRIST

## 2023-06-08 PROCEDURE — 99213 PR OFFICE/OUTPT VISIT, EST, LEVL III, 20-29 MIN: ICD-10-PCS | Mod: 25,S$GLB,, | Performed by: PODIATRIST

## 2023-06-08 RX ORDER — FLUCONAZOLE 150 MG/1
150 TABLET ORAL
COMMUNITY
Start: 2023-06-06

## 2023-06-08 RX ADMIN — DEXAMETHASONE SODIUM PHOSPHATE 4 MG: 4 INJECTION, SOLUTION INTRA-ARTICULAR; INTRALESIONAL; INTRAMUSCULAR; INTRAVENOUS; SOFT TISSUE at 09:06

## 2023-06-08 RX ADMIN — LIDOCAINE HYDROCHLORIDE 1 ML: 10 INJECTION INFILTRATION; PERINEURAL at 09:06

## 2023-06-20 RX ORDER — LIDOCAINE HYDROCHLORIDE 10 MG/ML
1 INJECTION INFILTRATION; PERINEURAL
Status: DISCONTINUED | OUTPATIENT
Start: 2023-06-08 | End: 2023-06-20 | Stop reason: HOSPADM

## 2023-06-20 RX ORDER — DEXAMETHASONE SODIUM PHOSPHATE 4 MG/ML
4 INJECTION, SOLUTION INTRA-ARTICULAR; INTRALESIONAL; INTRAMUSCULAR; INTRAVENOUS; SOFT TISSUE
Status: DISCONTINUED | OUTPATIENT
Start: 2023-06-08 | End: 2023-06-20 | Stop reason: HOSPADM

## 2023-06-20 NOTE — PROGRESS NOTES
Subjective:     Patient ID: Raghav Zheng is a 37 y.o. male.    Chief Complaint: Foot Pain    Raghav is a 37 y.o. male who presents to the podiatry clinic  with complaint of  right foot pain. Onset of the symptoms was several weeks ago. Precipitating event: injured while playing basketball and increased activity. Current symptoms include: ability to bear weight, but with some pain, swelling, and worsening symptoms after a period of activity. Aggravating factors:  prolonged use . Symptoms have gradually improved. Patient has had prior foot problems. Evaluation to date: none. Treatment to date: OTC analgesics which are somewhat effective and rest and previous foot taping and strapping and injection therapy which helped temporarily. Patients rates pain 5/10 on pain scale.    Review of Systems   Constitutional: Negative for chills and fever.   Cardiovascular:  Negative for chest pain and leg swelling.   Respiratory:  Negative for cough and shortness of breath.    Gastrointestinal:  Negative for diarrhea, nausea and vomiting.      Objective:     Physical Exam  Vitals reviewed.   Constitutional:       General: He is not in acute distress.     Appearance: Normal appearance. He is not ill-appearing.   HENT:      Head: Normocephalic.      Nose: Nose normal.   Cardiovascular:      Rate and Rhythm: Normal rate.   Pulmonary:      Effort: Pulmonary effort is normal. No respiratory distress.   Skin:     Capillary Refill: Capillary refill takes 2 to 3 seconds.   Neurological:      Mental Status: He is alert and oriented to person, place, and time.   Psychiatric:         Mood and Affect: Mood normal.         Behavior: Behavior normal.         Thought Content: Thought content normal.     Neurologic:  Protective and light touch sensation intact bilateral lower extremity  Vascular:  DP and PT pulses palpable 2/4 bilateral foot, capillary fill time less than 3 seconds to distal digits bilateral foot   Musculoskeletal:  5/5 muscle  strength noted bilateral foot, right foot has pain and tenderness at the peroneal brevis tendon insertion and along the lateral margin of the right foot.  Patient has pain and tenderness width inversion of the right foot but at the distal portion of the foot near the 5th metatarsal  Dermatologic:  No open lesions noted bilateral foot, no rashes noted bilateral foot, no bruising noted to the lateral right foot, mild edema noted lateral right foot    Assessment:      Encounter Diagnoses   Name Primary?    Right foot strain, subsequent encounter Yes    Right foot pain     Difficulty walking     Bursitis of right foot      Plan:     Raghav was seen today for foot pain.    Diagnoses and all orders for this visit:    Right foot strain, subsequent encounter  -     Small Joint Aspiration/Injection: R small MTP    Right foot pain  -     Small Joint Aspiration/Injection: R small MTP    Difficulty walking  -     Small Joint Aspiration/Injection: R small MTP    Bursitis of right foot  -     Small Joint Aspiration/Injection: R small MTP      I counseled the patient on his conditions, their implications and medical management.           1. Patient was examined and evaluated.    2. Again Discussed with patient etiology of right foot strain.  Patient was advised to continue to perform PRICE therapy at home.  Patient was advised to discontinue basketball until symptoms improve.    Small Joint Aspiration/Injection: R small MTP    Date/Time: 6/8/2023 9:15 AM  Performed by: Linh Dubois DPM  Authorized by: Linh Dubois DPM     Consent Done?:  Yes (Verbal)  Indications:  Pain and joint swelling  Location:  Small toe  Site:  R small MTP  Ultrasonic guidance for needle placement?: No    Needle size:  25 G  Approach:  Lateral  Medications:  1 mL LIDOcaine HCL 10 mg/ml (1%) 10 mg/mL (1 %); 4 mg dexAMETHasone 4 mg/mL  Patient tolerance:  Patient tolerated the procedure well with no immediate complications    3. Discussed with  patient etiology of peroneal brevis tendinitis.  Patient was shown active and passive range of motion techniques.  Patient was advised to alter shoe gear for better comfort and fit   4. Patient will follow-up in 1 week or p.r.n. for complaints

## 2023-06-22 ENCOUNTER — OFFICE VISIT (OUTPATIENT)
Dept: PODIATRY | Facility: CLINIC | Age: 38
End: 2023-06-22
Payer: COMMERCIAL

## 2023-06-22 VITALS
HEIGHT: 74 IN | HEART RATE: 73 BPM | BODY MASS INDEX: 40.43 KG/M2 | DIASTOLIC BLOOD PRESSURE: 81 MMHG | WEIGHT: 315 LBS | SYSTOLIC BLOOD PRESSURE: 130 MMHG

## 2023-06-22 DIAGNOSIS — M79.671 RIGHT FOOT PAIN: ICD-10-CM

## 2023-06-22 DIAGNOSIS — S96.911D RIGHT FOOT STRAIN, SUBSEQUENT ENCOUNTER: Primary | ICD-10-CM

## 2023-06-22 PROCEDURE — 3075F PR MOST RECENT SYSTOLIC BLOOD PRESS GE 130-139MM HG: ICD-10-PCS | Mod: S$GLB,,, | Performed by: PODIATRIST

## 2023-06-22 PROCEDURE — 3079F PR MOST RECENT DIASTOLIC BLOOD PRESSURE 80-89 MM HG: ICD-10-PCS | Mod: S$GLB,,, | Performed by: PODIATRIST

## 2023-06-22 PROCEDURE — 1159F PR MEDICATION LIST DOCUMENTED IN MEDICAL RECORD: ICD-10-PCS | Mod: S$GLB,,, | Performed by: PODIATRIST

## 2023-06-22 PROCEDURE — 1159F MED LIST DOCD IN RCRD: CPT | Mod: S$GLB,,, | Performed by: PODIATRIST

## 2023-06-22 PROCEDURE — 1160F RVW MEDS BY RX/DR IN RCRD: CPT | Mod: S$GLB,,, | Performed by: PODIATRIST

## 2023-06-22 PROCEDURE — 3079F DIAST BP 80-89 MM HG: CPT | Mod: S$GLB,,, | Performed by: PODIATRIST

## 2023-06-22 PROCEDURE — 4010F ACE/ARB THERAPY RXD/TAKEN: CPT | Mod: S$GLB,,, | Performed by: PODIATRIST

## 2023-06-22 PROCEDURE — 3008F BODY MASS INDEX DOCD: CPT | Mod: S$GLB,,, | Performed by: PODIATRIST

## 2023-06-22 PROCEDURE — 3008F PR BODY MASS INDEX (BMI) DOCUMENTED: ICD-10-PCS | Mod: S$GLB,,, | Performed by: PODIATRIST

## 2023-06-22 PROCEDURE — 99212 PR OFFICE/OUTPT VISIT, EST, LEVL II, 10-19 MIN: ICD-10-PCS | Mod: S$GLB,,, | Performed by: PODIATRIST

## 2023-06-22 PROCEDURE — 4010F PR ACE/ARB THEARPY RXD/TAKEN: ICD-10-PCS | Mod: S$GLB,,, | Performed by: PODIATRIST

## 2023-06-22 PROCEDURE — 3075F SYST BP GE 130 - 139MM HG: CPT | Mod: S$GLB,,, | Performed by: PODIATRIST

## 2023-06-22 PROCEDURE — 1160F PR REVIEW ALL MEDS BY PRESCRIBER/CLIN PHARMACIST DOCUMENTED: ICD-10-PCS | Mod: S$GLB,,, | Performed by: PODIATRIST

## 2023-06-22 PROCEDURE — 99212 OFFICE O/P EST SF 10 MIN: CPT | Mod: S$GLB,,, | Performed by: PODIATRIST

## 2023-06-22 RX ORDER — CLOTRIMAZOLE AND BETAMETHASONE DIPROPIONATE 10; .64 MG/G; MG/G
CREAM TOPICAL 2 TIMES DAILY
COMMUNITY
Start: 2023-06-09

## 2023-06-22 RX ORDER — EMTRICITABINE AND TENOFOVIR ALAFENAMIDE 200; 25 MG/1; MG/1
1 TABLET ORAL
COMMUNITY
Start: 2023-06-20

## 2023-06-22 RX ORDER — MONTELUKAST SODIUM 10 MG/1
10 TABLET ORAL
COMMUNITY
Start: 2023-06-21

## 2023-06-22 NOTE — PROGRESS NOTES
Subjective:     Patient ID: Raghav Zheng is a 37 y.o. male.    Chief Complaint: Follow-up and Foot Pain    Raghav is a 37 y.o. male who presents to the podiatry clinic  with complaint of  right foot pain.  Precipitating event: injured while playing basketball and increased activity. Current symptoms include: ability to bear weight, but with some pain, swelling, and worsening symptoms after a period of activity. Aggravating factors:  prolonged use . Symptoms have gradually improved. Patient has had prior foot problems. Evaluation to date: none. Treatment to date: OTC analgesics which are somewhat effective and rest and previous foot taping and strapping and injection therapy which very effective. Patients rates pain 0/10 on pain scale.    Review of Systems   Constitutional: Negative for chills and fever.   Cardiovascular:  Negative for chest pain and leg swelling.   Respiratory:  Negative for cough and shortness of breath.    Gastrointestinal:  Negative for diarrhea, nausea and vomiting.      Objective:     Physical Exam  Vitals reviewed.   Constitutional:       General: He is not in acute distress.     Appearance: Normal appearance. He is not ill-appearing.   HENT:      Head: Normocephalic.      Nose: Nose normal.   Cardiovascular:      Rate and Rhythm: Normal rate.   Pulmonary:      Effort: Pulmonary effort is normal. No respiratory distress.   Skin:     Capillary Refill: Capillary refill takes 2 to 3 seconds.   Neurological:      Mental Status: He is alert and oriented to person, place, and time.   Psychiatric:         Mood and Affect: Mood normal.         Behavior: Behavior normal.         Thought Content: Thought content normal.     Neurologic:  Protective and light touch sensation intact bilateral lower extremity  Vascular:  DP and PT pulses palpable 2/4 bilateral foot, capillary fill time less than 3 seconds to distal digits bilateral foot   Musculoskeletal:  5/5 muscle strength noted bilateral foot,  no pain  or tenderness with palpation right peroneal brevis tendon insertion right foot, no irritation or pain with inversion or eversion of the right foot, no masses present bilateral foot  Dermatologic:  No open lesions noted bilateral foot, no rashes noted bilateral foot, no bruising noted to the lateral right foot, NO edema noted lateral right foot    Assessment:      Encounter Diagnoses   Name Primary?    Right foot strain, subsequent encounter Yes    Right foot pain      Plan:     Raghav was seen today for follow-up and foot pain.    Diagnoses and all orders for this visit:    Right foot strain, subsequent encounter    Right foot pain      I counseled the patient on his conditions, their implications and medical management.        Patient was examined and evaluated.    Patient made aware that his previous right ankle sprain / strain seems to have completely healed.  Patient will monitor  right lower extremity/ ankle for return of symptoms.  Should symptoms arise, patient will consider OTC NSAIDs for pain relief as well as ice and elevation.  Patient was advised continue with comfortable shoe gear both inside and outside the home  Patient will follow-up p.r.n. for complaints